# Patient Record
Sex: MALE | Race: WHITE | URBAN - METROPOLITAN AREA
[De-identification: names, ages, dates, MRNs, and addresses within clinical notes are randomized per-mention and may not be internally consistent; named-entity substitution may affect disease eponyms.]

---

## 2017-07-05 ENCOUNTER — IMPORTED ENCOUNTER (OUTPATIENT)
Dept: URBAN - METROPOLITAN AREA CLINIC 50 | Facility: CLINIC | Age: 77
End: 2017-07-05

## 2017-07-06 ENCOUNTER — IMPORTED ENCOUNTER (OUTPATIENT)
Dept: URBAN - METROPOLITAN AREA CLINIC 50 | Facility: CLINIC | Age: 77
End: 2017-07-06

## 2017-10-23 ENCOUNTER — IMPORTED ENCOUNTER (OUTPATIENT)
Dept: URBAN - METROPOLITAN AREA CLINIC 50 | Facility: CLINIC | Age: 77
End: 2017-10-23

## 2018-05-22 ENCOUNTER — IMPORTED ENCOUNTER (OUTPATIENT)
Dept: URBAN - METROPOLITAN AREA CLINIC 50 | Facility: CLINIC | Age: 78
End: 2018-05-22

## 2018-10-09 ENCOUNTER — IMPORTED ENCOUNTER (OUTPATIENT)
Dept: URBAN - METROPOLITAN AREA CLINIC 50 | Facility: CLINIC | Age: 78
End: 2018-10-09

## 2018-11-07 ENCOUNTER — IMPORTED ENCOUNTER (OUTPATIENT)
Dept: URBAN - METROPOLITAN AREA CLINIC 50 | Facility: CLINIC | Age: 78
End: 2018-11-07

## 2019-03-28 ENCOUNTER — IMPORTED ENCOUNTER (OUTPATIENT)
Dept: URBAN - METROPOLITAN AREA CLINIC 50 | Facility: CLINIC | Age: 79
End: 2019-03-28

## 2019-04-01 ENCOUNTER — IMPORTED ENCOUNTER (OUTPATIENT)
Dept: URBAN - METROPOLITAN AREA CLINIC 50 | Facility: CLINIC | Age: 79
End: 2019-04-01

## 2019-04-03 ENCOUNTER — IMPORTED ENCOUNTER (OUTPATIENT)
Dept: URBAN - METROPOLITAN AREA CLINIC 50 | Facility: CLINIC | Age: 79
End: 2019-04-03

## 2019-04-18 ENCOUNTER — IMPORTED ENCOUNTER (OUTPATIENT)
Dept: URBAN - METROPOLITAN AREA CLINIC 50 | Facility: CLINIC | Age: 79
End: 2019-04-18

## 2019-04-23 ENCOUNTER — IMPORTED ENCOUNTER (OUTPATIENT)
Dept: URBAN - METROPOLITAN AREA CLINIC 50 | Facility: CLINIC | Age: 79
End: 2019-04-23

## 2019-05-08 ENCOUNTER — IMPORTED ENCOUNTER (OUTPATIENT)
Dept: URBAN - METROPOLITAN AREA CLINIC 50 | Facility: CLINIC | Age: 79
End: 2019-05-08

## 2019-05-14 ENCOUNTER — IMPORTED ENCOUNTER (OUTPATIENT)
Dept: URBAN - METROPOLITAN AREA CLINIC 50 | Facility: CLINIC | Age: 79
End: 2019-05-14

## 2019-05-22 ENCOUNTER — IMPORTED ENCOUNTER (OUTPATIENT)
Dept: URBAN - METROPOLITAN AREA CLINIC 50 | Facility: CLINIC | Age: 79
End: 2019-05-22

## 2019-05-22 NOTE — PATIENT DISCUSSION
"""S/P IOL OD: Daniele [id number] 19.0 @ 711ZK +Femto +Omidria. Continue post operative instructions and drops per schedule.  """

## 2019-05-28 ENCOUNTER — IMPORTED ENCOUNTER (OUTPATIENT)
Dept: URBAN - METROPOLITAN AREA CLINIC 50 | Facility: CLINIC | Age: 79
End: 2019-05-28

## 2019-05-28 NOTE — PATIENT DISCUSSION
"""S/P IOL OD: Daniele [id number] 19.0 @ 903FN +Femto +Omidria. Continue post operative instructions and drops per schedule.  """

## 2019-06-03 ENCOUNTER — IMPORTED ENCOUNTER (OUTPATIENT)
Dept: URBAN - METROPOLITAN AREA CLINIC 50 | Facility: CLINIC | Age: 79
End: 2019-06-03

## 2019-06-18 ENCOUNTER — IMPORTED ENCOUNTER (OUTPATIENT)
Dept: URBAN - METROPOLITAN AREA CLINIC 50 | Facility: CLINIC | Age: 79
End: 2019-06-18

## 2019-06-18 NOTE — PATIENT DISCUSSION
"""S/P IOL OU: OD: Tecnis [id number] 19.0 @ 005ÂºFemtoOmidria. OS: Tecnis [id number] 19.5 (ORA) @ 180Âº +ORA. "

## 2019-07-23 ENCOUNTER — IMPORTED ENCOUNTER (OUTPATIENT)
Dept: URBAN - METROPOLITAN AREA CLINIC 50 | Facility: CLINIC | Age: 79
End: 2019-07-23

## 2019-11-05 ENCOUNTER — IMPORTED ENCOUNTER (OUTPATIENT)
Dept: URBAN - METROPOLITAN AREA CLINIC 50 | Facility: CLINIC | Age: 79
End: 2019-11-05

## 2020-06-15 ENCOUNTER — IMPORTED ENCOUNTER (OUTPATIENT)
Dept: URBAN - METROPOLITAN AREA CLINIC 50 | Facility: CLINIC | Age: 80
End: 2020-06-15

## 2020-06-29 ENCOUNTER — IMPORTED ENCOUNTER (OUTPATIENT)
Dept: URBAN - METROPOLITAN AREA CLINIC 50 | Facility: CLINIC | Age: 80
End: 2020-06-29

## 2020-11-17 ENCOUNTER — IMPORTED ENCOUNTER (OUTPATIENT)
Dept: URBAN - METROPOLITAN AREA CLINIC 50 | Facility: CLINIC | Age: 80
End: 2020-11-17

## 2021-04-18 ASSESSMENT — VISUAL ACUITY
OS_CC: 20/30+2
OS_OTHER: 20/30-. 20/50-.
OD_SC: 20/80
OD_CC: J1@ 15 IN
OS_BAT: 20/400
OD_CC: 20/30-1
OS_BAT: 20/30
OS_OTHER: 20/40-. 20/60-.
OS_CC: J1@ 15 IN
OS_PH: 20/25-2
OS_BAT: 20/30
OD_CC: J1+
OS_SC: 20/30
OS_CC: J1+@ 15 IN
OS_CC: 20/20
OD_BAT: 20/30-
OD_PH: 20/25+2
OS_CC: 20/25
OS_BAT: 20/40-
OD_CC: 20/25
OS_OTHER: 20/30. 20/60.
OD_OTHER: 20/30. 20/40.
OS_CC: 20/40+2
OD_CC: 20/30-2
OS_SC: 20/30-2
OD_OTHER: 20/40. 20/50-.
OS_CC: 20/400
OD_BAT: 20/40
OD_OTHER: 20/30-1. 20/70.
OD_CC: 20/30+2
OD_SC: 20/25
OD_CC: J1+
OD_BAT: 20/30-1
OD_OTHER: 20/30-. 20/50-.
OD_BAT: 20/400
OD_CC: 20/30
OS_OTHER: 20/400.
OD_BAT: 20/400
OD_SC: 20/30
OD_OTHER: 20/400.
OD_OTHER: 20/400.
OD_CC: 20/40
OD_PH: 20/30-1
OD_CC: 20/30-2
OS_CC: J1+
OS_CC: 20/30-2
OS_OTHER: 20/400. 20/400.
OD_SC: 20/25-3
OS_CC: J1+
OS_BAT: 20/30-
OD_CC: J1+
OS_CC: 20/40+1
OD_OTHER: 20/400.
OS_SC: 20/25
OD_BAT: 20/400
OS_BAT: 20/400
OD_CC: 20/20
OS_CC: J1+
OS_CC: 20/25
OD_BAT: 20/30
OD_CC: J1+@ 15 IN
OS_OTHER: 20/30. 20/60.
OS_CC: 20/30-3

## 2021-04-18 ASSESSMENT — PACHYMETRY
OD_CT_UM: 614
OS_CT_UM: 606
OD_CT_UM: 614
OS_CT_UM: 606
OD_CT_UM: 614
OS_CT_UM: 606
OD_CT_UM: 614
OD_CT_UM: 614
OS_CT_UM: 606
OS_CT_UM: 606
OD_CT_UM: 614
OS_CT_UM: 606
OD_CT_UM: 614
OS_CT_UM: 606
OD_CT_UM: 614
OD_CT_UM: 614
OS_CT_UM: 606
OD_CT_UM: 614
OS_CT_UM: 606
OD_CT_UM: 614

## 2021-04-18 ASSESSMENT — TONOMETRY
OD_IOP_MMHG: 15
OD_IOP_MMHG: 11
OS_IOP_MMHG: 11
OD_IOP_MMHG: 14
OS_IOP_MMHG: 11
OD_IOP_MMHG: 15
OS_IOP_MMHG: 14
OS_IOP_MMHG: 11
OS_IOP_MMHG: 14
OS_IOP_MMHG: 12
OD_IOP_MMHG: 13
OS_IOP_MMHG: 15
OD_IOP_MMHG: 14
OD_IOP_MMHG: 11
OS_IOP_MMHG: 25
OS_IOP_MMHG: 16
OS_IOP_MMHG: 14
OS_IOP_MMHG: 14
OD_IOP_MMHG: 13
OD_IOP_MMHG: 9
OS_IOP_MMHG: 11
OD_IOP_MMHG: 10
OS_IOP_MMHG: 10
OS_IOP_MMHG: 14
OD_IOP_MMHG: 16
OD_IOP_MMHG: 13
OS_IOP_MMHG: 13
OS_IOP_MMHG: 22
OD_IOP_MMHG: 11
OS_IOP_MMHG: 13
OS_IOP_MMHG: 14
OD_IOP_MMHG: 14
OD_IOP_MMHG: 12
OD_IOP_MMHG: 12
OD_IOP_MMHG: 14
OD_IOP_MMHG: 18

## 2021-08-18 ENCOUNTER — PROBLEM (OUTPATIENT)
Dept: URBAN - METROPOLITAN AREA CLINIC 48 | Facility: CLINIC | Age: 81
End: 2021-08-18

## 2021-08-18 DIAGNOSIS — H04.123: ICD-10-CM

## 2021-08-18 DIAGNOSIS — H40.013: ICD-10-CM

## 2021-08-18 PROCEDURE — 92014 COMPRE OPH EXAM EST PT 1/>: CPT

## 2021-08-18 RX ORDER — LOTEPREDNOL ETABONATE 3.8 MG/G: 1 GEL OPHTHALMIC

## 2021-08-18 ASSESSMENT — VISUAL ACUITY
OU_CC: J1+
OS_CC: 20/30+2
OS_GLARE: 20/40
OU_CC: 20/25-1
OS_PH: 20/25
OD_PH: 20/25-2
OD_CC: 20/30-2
OD_GLARE: 20/40

## 2021-08-18 ASSESSMENT — TONOMETRY
OD_IOP_MMHG: 13
OS_IOP_MMHG: 16
OD_IOP_MMHG: 16
OS_IOP_MMHG: 13

## 2021-08-18 NOTE — PATIENT DISCUSSION
PCO ([address] [place] [address]): Visually significant PCO present on exam today. Recommend YAG laser capsulotomy to improve vision and decrease glare symptoms. RBAs of procedure discussed. Patient agrees and wishes to proceed.

## 2021-10-28 ENCOUNTER — ANNUAL COMPREHENSIVE EXAM (OUTPATIENT)
Dept: URBAN - METROPOLITAN AREA CLINIC 52 | Facility: CLINIC | Age: 81
End: 2021-10-28

## 2021-10-28 DIAGNOSIS — R73.03: ICD-10-CM

## 2021-10-28 DIAGNOSIS — H04.123: ICD-10-CM

## 2021-10-28 DIAGNOSIS — H35.372: ICD-10-CM

## 2021-10-28 DIAGNOSIS — H26.493: ICD-10-CM

## 2021-10-28 PROCEDURE — 92134 CPTRZ OPH DX IMG PST SGM RTA: CPT

## 2021-10-28 PROCEDURE — 92015 DETERMINE REFRACTIVE STATE: CPT

## 2021-10-28 PROCEDURE — 92014 COMPRE OPH EXAM EST PT 1/>: CPT

## 2021-10-28 ASSESSMENT — VISUAL ACUITY
OD_GLARE: 20/30
OS_CC: 20/25+2
OD_CC: 20/20-1
OS_GLARE: 20/30
OD_GLARE: 20/30
OU_CC: J1
OS_GLARE: 20/40-2

## 2021-10-28 ASSESSMENT — TONOMETRY
OD_IOP_MMHG: 13
OD_IOP_MMHG: 10
OS_IOP_MMHG: 14
OS_IOP_MMHG: 11

## 2021-10-28 NOTE — PATIENT DISCUSSION
New spec Rx improves vision to 20/20 OU. No need for treatment at this time. Will continue to monitor.

## 2022-01-31 ENCOUNTER — ESTABLISHED PATIENT (OUTPATIENT)
Dept: URBAN - METROPOLITAN AREA CLINIC 53 | Facility: CLINIC | Age: 82
End: 2022-01-31

## 2022-01-31 DIAGNOSIS — H04.123: ICD-10-CM

## 2022-01-31 PROCEDURE — 92012 INTRM OPH EXAM EST PATIENT: CPT

## 2022-01-31 ASSESSMENT — TONOMETRY
OD_IOP_MMHG: 11
OS_IOP_MMHG: 11
OD_IOP_MMHG: 14
OS_IOP_MMHG: 14

## 2022-01-31 ASSESSMENT — VISUAL ACUITY
OS_CC: 20/25
OD_CC: 20/25
OU_CC: 20/25

## 2022-05-05 ENCOUNTER — ESTABLISHED PATIENT (OUTPATIENT)
Dept: URBAN - METROPOLITAN AREA CLINIC 52 | Facility: CLINIC | Age: 82
End: 2022-05-05

## 2022-05-05 DIAGNOSIS — R73.03: ICD-10-CM

## 2022-05-05 DIAGNOSIS — H26.493: ICD-10-CM

## 2022-05-05 DIAGNOSIS — R42: ICD-10-CM

## 2022-05-05 PROCEDURE — 92014 COMPRE OPH EXAM EST PT 1/>: CPT

## 2022-05-05 PROCEDURE — 66821 AFTER CATARACT LASER SURGERY: CPT

## 2022-05-05 RX ORDER — PREDNISOLONE ACETATE 10 MG/ML: 1 SUSPENSION/ DROPS OPHTHALMIC TWICE A DAY

## 2022-05-05 ASSESSMENT — VISUAL ACUITY
OU_CC: J1+
OS_PH: 20/25-2
OS_CC: 20/30-1
OD_PH: 20/25-2
OD_CC: 20/30-2

## 2022-05-05 ASSESSMENT — TONOMETRY
OD_IOP_MMHG: 8
OS_IOP_MMHG: 13
OS_IOP_MMHG: 10
OD_IOP_MMHG: 11

## 2022-05-05 NOTE — PATIENT DISCUSSION
PCO ([address] [place] [address]): Visually significant PCO present on exam today. Recommend YAG laser capsulotomy to improve vision and decrease glare symptoms. RBAs of procedure discussed. Patient agrees and wishes to proceed.

## 2022-05-05 NOTE — PROCEDURE NOTE: CLINICAL
PROCEDURE NOTE: YAG Capsulotomy OS. Diagnosis: Posterior Capsular Opacification (PCO). Prep: Mydriacil 1% and Phenylephrine 2.5%. Prior to treatment, the risks/benefits/alternatives were discussed. The patient wished to proceed with procedure. Consent was signed. Proparacaine and brominidine were placed into the operative eye after the eye was dilated. Power = 3.4mJ. Number of pulses = 48. Patient tolerated procedure well and there were no complications. Post Laser instructions given. Landry Wilder

## 2022-11-28 ENCOUNTER — CLINIC PROCEDURE ONLY (OUTPATIENT)
Dept: URBAN - METROPOLITAN AREA CLINIC 53 | Facility: CLINIC | Age: 82
End: 2022-11-28

## 2022-11-28 DIAGNOSIS — H26.491: ICD-10-CM

## 2022-11-28 PROCEDURE — 66821 AFTER CATARACT LASER SURGERY: CPT

## 2022-11-28 ASSESSMENT — TONOMETRY
OD_IOP_MMHG: 11
OD_IOP_MMHG: 14
OS_IOP_MMHG: 14
OS_IOP_MMHG: 11

## 2022-11-28 ASSESSMENT — VISUAL ACUITY
OD_GLARE: 20/50
OD_CC: 20/20
OS_CC: 20/20
OD_GLARE: 20/40

## 2022-11-28 NOTE — PROCEDURE NOTE: CLINICAL
PROCEDURE NOTE: YAG Capsulotomy OD. Diagnosis: Posterior Capsular Opacification (PCO). Prep: Mydriacil 1% and Phenylephrine 2.5%. Prior to treatment, the risks/benefits/alternatives were discussed. The patient wished to proceed with procedure. Consent was signed. Proparacaine and brominidine were placed into the operative eye after the eye was dilated. Power = 3.8mJ. Number of pulses = 66. Patient tolerated procedure well and there were no complications. Post Laser instructions given. Landry Wilder

## 2022-11-28 NOTE — PATIENT DISCUSSION
PCO ([address] [place] [address]): Visually significant PCO present on exam today. Recommend YAG laser capsulotomy to improve vision and decrease glare symptoms. RBAs of procedure discussed. Patient agrees and wishes to proceed.

## 2023-01-10 ENCOUNTER — POST-OP (OUTPATIENT)
Dept: URBAN - METROPOLITAN AREA CLINIC 53 | Facility: CLINIC | Age: 83
End: 2023-01-10

## 2023-01-10 DIAGNOSIS — Z01.01: ICD-10-CM

## 2023-01-10 DIAGNOSIS — H52.4: ICD-10-CM

## 2023-01-10 DIAGNOSIS — Z98.890: ICD-10-CM

## 2023-01-10 PROCEDURE — 92015 DETERMINE REFRACTIVE STATE: CPT

## 2023-01-10 ASSESSMENT — TONOMETRY
OD_IOP_MMHG: 9
OS_IOP_MMHG: 13
OD_IOP_MMHG: 12
OS_IOP_MMHG: 10

## 2023-01-10 ASSESSMENT — VISUAL ACUITY
OS_PH: 20/25
OD_CC: 20/25+2
OU_CC: J1+ @ 14"
OS_CC: 20/30+2

## 2023-11-21 ENCOUNTER — PREPPED CHART (OUTPATIENT)
Dept: URBAN - METROPOLITAN AREA CLINIC 52 | Facility: CLINIC | Age: 83
End: 2023-11-21

## 2023-11-30 ENCOUNTER — COMPREHENSIVE EXAM (OUTPATIENT)
Dept: URBAN - METROPOLITAN AREA CLINIC 52 | Facility: CLINIC | Age: 83
End: 2023-11-30

## 2023-11-30 DIAGNOSIS — R73.03: ICD-10-CM

## 2023-11-30 DIAGNOSIS — H35.372: ICD-10-CM

## 2023-11-30 DIAGNOSIS — H04.123: ICD-10-CM

## 2023-11-30 PROCEDURE — 92014 COMPRE OPH EXAM EST PT 1/>: CPT

## 2023-11-30 PROCEDURE — 92134 CPTRZ OPH DX IMG PST SGM RTA: CPT

## 2023-11-30 ASSESSMENT — VISUAL ACUITY
OU_CC: 20/20
OD_CC: 20/20
OU_CC: J1+
OS_CC: 20/20-1

## 2023-11-30 ASSESSMENT — TONOMETRY
OS_IOP_MMHG: 14
OD_IOP_MMHG: 12

## 2024-12-11 ENCOUNTER — COMPREHENSIVE EXAM (OUTPATIENT)
Age: 84
End: 2024-12-11

## 2024-12-11 DIAGNOSIS — Z01.01: ICD-10-CM

## 2024-12-11 DIAGNOSIS — H52.4: ICD-10-CM

## 2024-12-11 PROCEDURE — 92015 DETERMINE REFRACTIVE STATE: CPT

## 2024-12-11 PROCEDURE — 92014 COMPRE OPH EXAM EST PT 1/>: CPT

## 2025-03-14 ENCOUNTER — DIAGNOSTICS ONLY (OUTPATIENT)
Age: 85
End: 2025-03-14

## 2025-03-14 DIAGNOSIS — H40.012: ICD-10-CM

## 2025-03-14 PROCEDURE — 92083 EXTENDED VISUAL FIELD XM: CPT
